# Patient Record
(demographics unavailable — no encounter records)

---

## 2025-04-15 NOTE — PHYSICAL EXAM
[No Acute Distress] : no acute distress [Conjunctivae with no discharge] : conjunctivae with no discharge [Clear tympanic membranes with bony landmarks and light reflex present bilaterally] : clear tympanic membranes with bony landmarks and light reflex present bilaterally  [Nonerythematous Oropharynx] : nonerythematous oropharynx [Clear to Auscultation Bilaterally] : clear to auscultation bilaterally [Regular Rate and Rhythm] : regular rate and rhythm [Normal S1, S2 audible] : normal S1, S2 audible [No Murmurs] : no murmurs [Soft] : soft [Satya: ____] : Satya [unfilled] [Satya: _____] : Satya [unfilled] [Straight] : straight

## 2025-04-15 NOTE — DISCUSSION/SUMMARY
[Full Activity without restrictions including Physical Education & Athletics] : Full Activity without restrictions including Physical Education & Athletics [FreeTextEntry1] : - discussed family's questions and concerns - growth percentiles wnl - vision screen not done as pt did not bring glasses - PHQ-2, CRAFFT and HEADSS assessments reviewed - can follow up in 1 year for next well visit

## 2025-04-15 NOTE — HISTORY OF PRESENT ILLNESS
[Influenza] : Influenza [HPV] : HPV [Mother] : mother [Yes] : Patient goes to dentist yearly [Normal] : normal [Age of Menarche: ____] : Age of Menarche: [unfilled] [Grade: ____] : Grade: [unfilled] [Has friends] : has friends [At least 1 hour of physical activity a day] : at least 1 hour of physical activity a day [No] : Patient has not had sexual intercourse [Has ways to cope with stress] : has ways to cope with stress [Uses electronic nicotine delivery system] : does not use electronic nicotine delivery system [Uses tobacco] : does not use tobacco [Uses drugs] : does not use drugs  [Drinks alcohol] : does not drink alcohol [Gets depressed, anxious, or irritable/has mood swings] : does not get depressed, anxious, or irritable/has mood swings [Has thought about hurting self or considered suicide] : has not thought about hurting self or considered suicide [de-identified] : entire body had swelling when exposed to sun in different [place] - mom is not sure if reaction is to sunscreen - requesting allergy referral  [de-identified] : track

## 2025-04-15 NOTE — HISTORY OF PRESENT ILLNESS
[Influenza] : Influenza [HPV] : HPV [Mother] : mother [Yes] : Patient goes to dentist yearly [Normal] : normal [Age of Menarche: ____] : Age of Menarche: [unfilled] [Grade: ____] : Grade: [unfilled] [Has friends] : has friends [At least 1 hour of physical activity a day] : at least 1 hour of physical activity a day [No] : Patient has not had sexual intercourse [Has ways to cope with stress] : has ways to cope with stress [Uses electronic nicotine delivery system] : does not use electronic nicotine delivery system [Uses tobacco] : does not use tobacco [Uses drugs] : does not use drugs  [Drinks alcohol] : does not drink alcohol [Gets depressed, anxious, or irritable/has mood swings] : does not get depressed, anxious, or irritable/has mood swings [Has thought about hurting self or considered suicide] : has not thought about hurting self or considered suicide [de-identified] : entire body had swelling when exposed to sun in different [place] - mom is not sure if reaction is to sunscreen - requesting allergy referral  [de-identified] : track

## 2025-04-15 NOTE — RISK ASSESSMENT
[3] : 1) Little interest or pleasure doing things for nearly every day (3) [0] : 2) Feeling down, depressed, or hopeless: Not at all (0) [No Increased risk of SCA or SCD] : No Increased risk of SCA or SCD    [Have you ever fainted, passed out or had an unexplained seizure suddenly and without warning, especially during exercise or in response] : Have you ever fainted, passed out or had an unexplained seizure suddenly and without warning, especially during exercise or in response to sudden loud noises such as doorbells, alarm clocks and ringing telephones? No [Have you ever had exercise-related chest pain or shortness of breath?] : Have you ever had exercise-related chest pain or shortness of breath? No [Has anyone in your immediate family (parents, grandparents, siblings) or other more distant relatives (aunts, uncles, cousins)  of heart] : Has anyone in your immediate family (parents, grandparents, siblings) or other more distant relatives (aunts, uncles, cousins)  of heart problems or had an unexpected sudden death before age 50 (This would include unexpected drownings, unexplained car accidents in which the relative was driving or sudden infant death syndrome.)? No [Are you related to anyone with hypertrophic cardiomyopathy or hypertrophic obstructive cardiomyopathy, Marfan syndrome, arrhythmogenic] : Are you related to anyone with hypertrophic cardiomyopathy or hypertrophic obstructive cardiomyopathy, Marfan syndrome, arrhythmogenic right ventricular cardiomyopathy, long QT syndrome, short QT syndrome, Brugada syndrome or catecholaminergic polymorphic ventricular tachycardia, or anyone younger than 50 years with a pacemaker or implantable defibrillator? No